# Patient Record
Sex: MALE | Race: WHITE | NOT HISPANIC OR LATINO | Employment: FULL TIME | URBAN - METROPOLITAN AREA
[De-identification: names, ages, dates, MRNs, and addresses within clinical notes are randomized per-mention and may not be internally consistent; named-entity substitution may affect disease eponyms.]

---

## 2024-03-13 ENCOUNTER — OFFICE VISIT (OUTPATIENT)
Dept: PRIMARY CARE | Facility: CLINIC | Age: 39
End: 2024-03-13
Payer: COMMERCIAL

## 2024-03-13 VITALS
BODY MASS INDEX: 27.83 KG/M2 | HEIGHT: 73 IN | OXYGEN SATURATION: 98 % | DIASTOLIC BLOOD PRESSURE: 58 MMHG | SYSTOLIC BLOOD PRESSURE: 100 MMHG | WEIGHT: 210 LBS | RESPIRATION RATE: 18 BRPM | TEMPERATURE: 97.8 F | HEART RATE: 77 BPM

## 2024-03-13 DIAGNOSIS — J34.2 DEVIATED SEPTUM: ICD-10-CM

## 2024-03-13 DIAGNOSIS — J32.1 FRONTAL SINUSITIS, UNSPECIFIED CHRONICITY: Primary | ICD-10-CM

## 2024-03-13 PROBLEM — N52.9 ERECTILE DYSFUNCTION: Status: ACTIVE | Noted: 2024-03-13

## 2024-03-13 PROBLEM — J18.9 PNEUMONIA: Status: ACTIVE | Noted: 2024-03-13

## 2024-03-13 PROBLEM — J02.9 SORE THROAT: Status: ACTIVE | Noted: 2024-03-13

## 2024-03-13 PROBLEM — F41.9 ANXIETY: Status: ACTIVE | Noted: 2024-03-13

## 2024-03-13 PROBLEM — R35.0 URINARY FREQUENCY: Status: ACTIVE | Noted: 2024-03-13

## 2024-03-13 PROBLEM — S39.012A STRAIN OF LUMBAR REGION: Status: ACTIVE | Noted: 2024-03-13

## 2024-03-13 PROBLEM — B02.9 SHINGLES: Status: ACTIVE | Noted: 2024-03-13

## 2024-03-13 PROBLEM — J10.1 INFLUENZA A (H1N1): Status: ACTIVE | Noted: 2024-03-13

## 2024-03-13 PROBLEM — K59.00 CONSTIPATION: Status: ACTIVE | Noted: 2024-03-13

## 2024-03-13 PROBLEM — J30.9 ALLERGIC RHINITIS: Status: ACTIVE | Noted: 2024-03-13

## 2024-03-13 PROBLEM — N40.0 BENIGN PROSTATE HYPERPLASIA: Status: ACTIVE | Noted: 2024-03-13

## 2024-03-13 PROBLEM — E66.9 OBESITY: Status: ACTIVE | Noted: 2024-03-13

## 2024-03-13 PROBLEM — S33.5XXA LUMBAR SPRAIN: Status: ACTIVE | Noted: 2024-03-13

## 2024-03-13 PROBLEM — N13.9 URINARY OBSTRUCTION: Status: ACTIVE | Noted: 2024-03-13

## 2024-03-13 PROBLEM — R68.89 FLU-LIKE SYMPTOMS: Status: ACTIVE | Noted: 2024-03-13

## 2024-03-13 PROBLEM — J34.3 HYPERTROPHY OF INFERIOR NASAL TURBINATE: Status: ACTIVE | Noted: 2024-03-13

## 2024-03-13 PROBLEM — S29.019A STRAIN OF THORACIC REGION: Status: ACTIVE | Noted: 2024-03-13

## 2024-03-13 PROBLEM — J32.9 SINUSITIS: Status: ACTIVE | Noted: 2024-03-13

## 2024-03-13 PROCEDURE — 99213 OFFICE O/P EST LOW 20 MIN: CPT | Performed by: FAMILY MEDICINE

## 2024-03-13 PROCEDURE — 1036F TOBACCO NON-USER: CPT | Performed by: FAMILY MEDICINE

## 2024-03-13 RX ORDER — DOXYCYCLINE 100 MG/1
100 CAPSULE ORAL 2 TIMES DAILY
Qty: 14 CAPSULE | Refills: 1 | Status: SHIPPED | OUTPATIENT
Start: 2024-03-13 | End: 2024-04-18

## 2024-03-13 RX ORDER — FLUTICASONE PROPIONATE 50 MCG
1 SPRAY, SUSPENSION (ML) NASAL DAILY
Qty: 16 G | Refills: 11 | Status: SHIPPED | OUTPATIENT
Start: 2024-03-13 | End: 2025-03-13

## 2024-03-13 RX ORDER — ALBUTEROL SULFATE 90 UG/1
2 AEROSOL, METERED RESPIRATORY (INHALATION) EVERY 6 HOURS PRN
Qty: 18 G | Refills: 3 | Status: SHIPPED | OUTPATIENT
Start: 2024-03-13

## 2024-03-13 ASSESSMENT — ENCOUNTER SYMPTOMS
DIAPHORESIS: 1
WHEEZING: 1
MYALGIAS: 1
SWEATS: 0
HEMOPTYSIS: 0
HEARTBURN: 0
WEIGHT LOSS: 0
SINUS PRESSURE: 1
FEVER: 0
SORE THROAT: 1
COUGH: 1
RHINORRHEA: 0
HEADACHES: 1
CHILLS: 0
SHORTNESS OF BREATH: 1

## 2024-03-13 NOTE — PROGRESS NOTES
Answers submitted by the patient for this visit:  Cough Questionnaire (Submitted on 3/13/2024)  Chief Complaint: Cough  Chronicity: new  Onset: in the past 7 days  Progression since onset: waxing and waning  Frequency: every few minutes  Cough characteristics: non-productive  ear congestion: Yes  ear pain: No  fever: No  headaches: Yes  heartburn: No  hemoptysis: No  myalgias: Yes  nasal congestion: Yes  postnasal drip: Yes  rash: No  rhinorrhea: No  shortness of breath: Yes  sore throat: Yes  sweats: No  weight loss: No  wheezing: Yes  Aggravated by: cold air, lying down  Risk factors for lung disease: animal exposure

## 2024-03-13 NOTE — PROGRESS NOTES
Subjective   Patient ID: Jessee Gonzalez is a 38 y.o. male who presents for Sinusitis (Declined swabs/).  Cough  This is a new problem. The current episode started in the past 7 days. The problem has been waxing and waning. The problem occurs every few minutes. The cough is Non-productive. Associated symptoms include ear congestion, headaches, myalgias, nasal congestion, postnasal drip, a sore throat, shortness of breath and wheezing. Pertinent negatives include no chills, ear pain, fever, heartburn, hemoptysis, rash, rhinorrhea, sweats or weight loss. The symptoms are aggravated by cold air and lying down. Risk factors for lung disease include animal exposure.       Review of Systems   Constitutional:  Positive for diaphoresis. Negative for chills, fever and weight loss.   HENT:  Positive for postnasal drip, sinus pressure and sore throat. Negative for ear pain, rhinorrhea and sneezing.    Respiratory:  Positive for cough, shortness of breath and wheezing. Negative for hemoptysis.    Gastrointestinal:  Negative for heartburn.   Musculoskeletal:  Positive for myalgias.   Skin:  Negative for rash.   Neurological:  Positive for headaches.       Objective   Physical Exam  Vitals: I have reviewed the vitals  General: Well-developed.  In no acute distress.  Eyes:   sclera nonicteric.  Conjunctiva not injected.  No discharge.   HEAD: Normocephalic, atraumatic.  HEENT   Mucous membranes moist.  Posterior oropharynx nonerythematous, no tonsillar exudates.      No cervical lymphadenopathy.... nose  with yellow coryza   Cardio: Regular rate and rhythm.  No murmur, rub or gallop.  Pulmonary: Lungs clear to auscultation in all fields.  No accessory muscle use.  GI/: Normal active bowel sounds.  Soft, nontender.  No masses or organomegaly appreciated.  Musculoskeletal: No gross deformities appreciated.  Neuro: Alert, age-appropriate.  Normal muscle tone.  Moving all extremities.  Skin: No rash, bruises or lesions.    Labs        Assessment/Plan   Problem List Items Addressed This Visit       Deviated septum    Sinusitis - Primary     Rest and drink plenty of water/fluid  Please use a humidifier or   use warm  mist  in a   hot shower ...repeat   3-4 times a day for approximatelly .... 10 minutes at a time..... this lessens congestion....  Use salt water sprays..... as directed... saline sprays  Apply a warm moist wash...cloth to  your face  3-4 times a day  Avoid tobacco smoke and other environmental irritants          Relevant Medications    doxycycline (Vibramycin) 100 mg capsule    fluticasone (Flonase) 50 mcg/actuation nasal spray    albuterol 90 mcg/actuation inhaler

## 2024-03-13 NOTE — PATIENT INSTRUCTIONS

## 2024-04-18 DIAGNOSIS — J32.1 FRONTAL SINUSITIS, UNSPECIFIED CHRONICITY: ICD-10-CM

## 2024-04-18 RX ORDER — DOXYCYCLINE 100 MG/1
100 CAPSULE ORAL 2 TIMES DAILY
Qty: 14 CAPSULE | Refills: 1 | Status: SHIPPED | OUTPATIENT
Start: 2024-04-18 | End: 2024-04-25

## 2024-05-22 ENCOUNTER — HOSPITAL ENCOUNTER (EMERGENCY)
Facility: HOSPITAL | Age: 39
Discharge: HOME | End: 2024-05-22
Payer: COMMERCIAL

## 2024-05-22 ENCOUNTER — APPOINTMENT (OUTPATIENT)
Dept: RADIOLOGY | Facility: HOSPITAL | Age: 39
End: 2024-05-22
Payer: COMMERCIAL

## 2024-05-22 VITALS
WEIGHT: 205 LBS | TEMPERATURE: 99.1 F | RESPIRATION RATE: 16 BRPM | OXYGEN SATURATION: 98 % | HEIGHT: 73 IN | BODY MASS INDEX: 27.17 KG/M2 | SYSTOLIC BLOOD PRESSURE: 121 MMHG | DIASTOLIC BLOOD PRESSURE: 89 MMHG | HEART RATE: 94 BPM

## 2024-05-22 DIAGNOSIS — M25.561 ACUTE PAIN OF RIGHT KNEE: Primary | ICD-10-CM

## 2024-05-22 PROCEDURE — 99283 EMERGENCY DEPT VISIT LOW MDM: CPT

## 2024-05-22 PROCEDURE — 99284 EMERGENCY DEPT VISIT MOD MDM: CPT

## 2024-05-22 PROCEDURE — 73560 X-RAY EXAM OF KNEE 1 OR 2: CPT | Mod: RT

## 2024-05-22 PROCEDURE — 73560 X-RAY EXAM OF KNEE 1 OR 2: CPT | Mod: RIGHT SIDE | Performed by: RADIOLOGY

## 2024-05-22 ASSESSMENT — PAIN DESCRIPTION - LOCATION: LOCATION: KNEE

## 2024-05-22 ASSESSMENT — PAIN SCALES - GENERAL: PAINLEVEL_OUTOF10: 6

## 2024-05-22 ASSESSMENT — PAIN DESCRIPTION - PAIN TYPE: TYPE: ACUTE PAIN

## 2024-05-22 ASSESSMENT — COLUMBIA-SUICIDE SEVERITY RATING SCALE - C-SSRS
1. IN THE PAST MONTH, HAVE YOU WISHED YOU WERE DEAD OR WISHED YOU COULD GO TO SLEEP AND NOT WAKE UP?: NO
2. HAVE YOU ACTUALLY HAD ANY THOUGHTS OF KILLING YOURSELF?: NO
6. HAVE YOU EVER DONE ANYTHING, STARTED TO DO ANYTHING, OR PREPARED TO DO ANYTHING TO END YOUR LIFE?: NO

## 2024-05-22 ASSESSMENT — PAIN - FUNCTIONAL ASSESSMENT: PAIN_FUNCTIONAL_ASSESSMENT: 0-10

## 2024-05-22 NOTE — Clinical Note
Jessee Gonzalez was seen and treated in our emergency department on 5/22/2024.  He may return to work on 05/27/2024.       If you have any questions or concerns, please don't hesitate to call.      Tena Post PA-C

## 2024-05-23 NOTE — ED PROVIDER NOTES
HPI    CC: Right knee pain    HPI:  Jessee Gonzalez is a 38 year-old male with no reported PMH presented with acute right knee pain and concerns for injury. Patient reports he slipped on small oil puddle on the floor while at work earlier today, causing him to temporarily lose his footing though he never actually fell. Denies any known pop, twist, hit or tear trauma. States pain is currently only 4-5/10 in severity. He hasn't tried taking anything for his pain PTA. Denies any prior injury to affected knee. Denies any clicking, locking, instability, extremity weakness or numbness. Reports pain exacerbated with flexion but not aggravated by walking or weight bearing.      PMHx:  PSH: reviewed per EMR  Allergies: Reviewed per EMR  Medications: Reviewed per EMR  FH: Noncontributory  Social Hx: Denies tobacco. Denies EtOH, illicit substance use.      ROS: All other systems were reviewed and negative.    Physical Exam:   GENERAL: Patient is well-appearing, cooperative. Vitals noted, NAD. Afebrile  HEAD: NC/AT.   NECK: Supple, full ROM. No lymphadenopathy.  EYES: PERRL, EOMI, anicteric sclera. No erythema or exudates.  ENMT: Hearing grossly intact. MMM, oronasopharynx is patent. Uvula midline. Normal phonation.  CV: Regular rate & rhythm. S1/S2 present. No murmur, gallops or rubs.  PULM: CTAB with normal effort. No wheezes, rales or rhonchi.  ABDOMEN: Soft, nontender, non-distended.   MSK: Spontaneously JOLLEY x 4. (+) Right anterior knee pain with large localized swelling over proximal tibia, just inferior to patella.  RIGHT LOWER EXTREMITY: There is no tenderness over the medial joint line. There is no tenderness over the lateral joint line. The extensor mechanism is intact. (+) Slight crepitus and significant swelling noted over anterior knee without erythema or warmth to surrounding skin. No joint effusion or obvious laxity. Remainder of the extremity, specifically, the tib-fib, ankle, and foot are nontender. Skin is intact  without discoloratin, rash or ulcerations. Is neurovascularly intact distally. There is no evidence of an intra-articular infection. Compartments are soft to palpation. No peripheral edema, cyanosis, clubbing or palable cords. No suggestion of DVT.  BACK: Nontender, Full ROM. Symmetric muscle bulk. No step-offs or deformity.  SKIN: Dry and intact. Capillary refill < 2sec.  NEURO: AA&Ox3, Speech fluent. No focal deficits identified. Ambulatory with antalgic gait, limps due to right knee pain. Not able to bear full weight on right leg. Answering questions appropriately.    -------------------------------------------------------------------------------------------  ED Course & MDM   Triage notes and vital signs were reviewed. History and physical exam were performed . I also reviewed recent and relevant outside records for thorough HPI.    Medical Decision Making  This is 38 year-old, otherwise healthy male presenting to ED with acute right knee pain concerning for injury after s/p near-fall. There was no head injury, LOC and patient was full weight bearing immediately afterwards. VSS. Patient is afebrile, ambulatory with steady gait and overall well appearing on arrival. No apparent distress, obvious deformities or external injury noted on evaluation. Exam of right lower extremity revealed small non-tender area of focal swelling over the tibial tuberosity. Lateral and medial joint lines of right knee were non-tender. No joint effusion, warmth or laxity to raise concern for meniscal injury or ruptured ligament. Full ROM intact including flexor and extensor mechanisms.   Based on KIRAN, clinical presentation most concern for acute musckuloskeletal strain/sprain. X-ray imaging of right knee obtained which demonstrated no acute fractures or dislocations. He declined offer for oral analgesics here at ED. I discussed radiological findings with patient and advised need for continued supportive care with RICE. He ambulates with  need for assistive devices and is stable at this time for discharge home. Patient was given soft knee brace prior to discharge and advised to take OTC ibuprofen and/or tylenol PRN for additional pain relief. Strict ED return precautions reviewed. He was instructed to follow-up with orthopedics outpatient on as needed basis if not trending better. Patient voiced understanding and amendable to treatment plan. All his questions were answered.  Differential Diagnoses Considered include: Fracture/dislocation, tendon rupture, ligamentous strain, muscle skeletal sprain      Amount and/or Complexity of Data Reviewed  Radiology: ordered and independent interpretation performed.     Details: No acute fracture or traumatic malalignment and no joint effusion noted. There was swelling over tibial tubercle.     Risk  OTC drugs.          Diagnoses as of 05/22/24 2303   Acute pain of right knee        Clinical Impression: Right knee pain    Disposition: Home-going  Counseled patient regarding lab results, radiology results and suspected diagnosis. Advised to follow-up with  orthopedic surgery as needed on out-patient basis if not improving as there are additional imaging studies that can be done for further evaluation. The patient has demonstrated clinical improvement and is ambulatory without need of assistive devices.  Patient declined prescription for any pain medication including ibuprofen or tylenol and agreed to take over the counter naproxen at home if need. ED return precautions were discussed and provided at time of discharge. Patient acknowledged their understanding and is amendable to the treatment plan. All discharge instructions explained to patient and all questions were answered.    Tena Post PA-C  [facility]    Disclaimer: This note was dictated by speech recognition. Minor errors in transcription may be present. Please call if  questions.  -------------------------------------------------------------------------------------------       Tena Post PA-C  05/31/24 2027

## 2024-05-23 NOTE — DISCHARGE INSTRUCTIONS
You may take ibuprofen 600mg every 4-6 hours as needed for pain and/or swelling.     Continue with supportive care efforts including rest, ice, elevating and applying compressing to your knee via either Ace wraps or a open patellar knee sleeve.  I sent a referral for orthopedics that you can schedule follow-up appointment as needed in 1 to 2 weeks if your symptoms are not trending better or if you continue to experience increased instability.  As there are additional imaging studies that they can do to assess the ligaments in your knee.    Please return to the ED if you experience any increased swelling, pain, redness or warmth to the knee or knee or noticed any purulent drainage or other signs of infection.    Please weight-bear as tolerated and avoid strenuous exercise until your knee pain has completely resolved

## 2024-06-11 ENCOUNTER — HOSPITAL ENCOUNTER (OUTPATIENT)
Dept: RADIOLOGY | Facility: CLINIC | Age: 39
Discharge: HOME | End: 2024-06-11
Payer: COMMERCIAL

## 2024-06-11 DIAGNOSIS — S83.91XA SPRAIN OF UNSPECIFIED SITE OF RIGHT KNEE, INITIAL ENCOUNTER: ICD-10-CM

## 2024-06-11 PROCEDURE — 73721 MRI JNT OF LWR EXTRE W/O DYE: CPT | Mod: RT

## 2024-06-11 PROCEDURE — 73721 MRI JNT OF LWR EXTRE W/O DYE: CPT | Mod: RIGHT SIDE | Performed by: RADIOLOGY
